# Patient Record
Sex: MALE | Race: WHITE | NOT HISPANIC OR LATINO | Employment: FULL TIME | URBAN - METROPOLITAN AREA
[De-identification: names, ages, dates, MRNs, and addresses within clinical notes are randomized per-mention and may not be internally consistent; named-entity substitution may affect disease eponyms.]

---

## 2017-01-22 ENCOUNTER — APPOINTMENT (OUTPATIENT)
Dept: GENERAL RADIOLOGY | Facility: HOSPITAL | Age: 32
End: 2017-01-22

## 2017-01-22 ENCOUNTER — HOSPITAL ENCOUNTER (EMERGENCY)
Facility: HOSPITAL | Age: 32
Discharge: HOME | End: 2017-01-22
Attending: EMERGENCY MEDICINE | Admitting: EMERGENCY MEDICINE

## 2017-01-22 VITALS
HEART RATE: 94 BPM | TEMPERATURE: 97.7 F | HEIGHT: 71 IN | SYSTOLIC BLOOD PRESSURE: 132 MMHG | WEIGHT: 220 LBS | DIASTOLIC BLOOD PRESSURE: 87 MMHG | BODY MASS INDEX: 30.8 KG/M2 | RESPIRATION RATE: 16 BRPM | OXYGEN SATURATION: 98 %

## 2017-01-22 DIAGNOSIS — S50.12XA CONTUSION OF LEFT FOREARM, INITIAL ENCOUNTER: Primary | ICD-10-CM

## 2017-01-22 PROCEDURE — 73090 X-RAY EXAM OF FOREARM: CPT

## 2017-01-22 PROCEDURE — 99283 EMERGENCY DEPT VISIT LOW MDM: CPT

## 2017-01-22 NOTE — ED NOTES
Left arm injury. States he hit it on metal during work. Says it is aching consistently and radiating to shoulder. Says it hurts worse when he makes a fist.      Vanesa Acuña, Nursing Student  01/22/17 0015

## 2017-01-23 NOTE — ED PROVIDER NOTES
EMERGENCY DEPARTMENT ENCOUNTER    CHIEF COMPLAINT  Chief Complaint: Arm Injury  History given by: patient  History limited by: None  Room Number: 25/25  PMD: No Known Provider      HPI:  Pt is a 31 y.o. male who presents complaining of left arm injury while at work today. The pt states he got up to walk around when he hit his forearm on a piece of metal. He denies numbness and tingling. No laceration. The pt denies fever, CP, and SOA. He denies other injury at this time.    Duration:  PTA  Onset: sudden  Timing: constant  Location: left forearm  Radiation: denies  Quality: brusing  Intensity/Severity: mild  Progression: unchanged  Associated Symptoms: denies  Aggravating Factors: ROM  Alleviating Factors: none  Previous Episodes: denies  Treatment before arrival: none    PAST MEDICAL HISTORY  Active Ambulatory Problems     Diagnosis Date Noted   • No Active Ambulatory Problems     Resolved Ambulatory Problems     Diagnosis Date Noted   • No Resolved Ambulatory Problems     Past Medical History   Diagnosis Date   • Hypertension        PAST SURGICAL HISTORY  Past Surgical History   Procedure Laterality Date   • Appendectomy     • Skin surgery       superficial knee injury       FAMILY HISTORY  History reviewed. No pertinent family history.    SOCIAL HISTORY  Social History     Social History   • Marital status: Single     Spouse name: N/A   • Number of children: N/A   • Years of education: N/A     Occupational History   • Not on file.     Social History Main Topics   • Smoking status: Current Every Day Smoker     Packs/day: 1.00     Years: 20.00     Types: Cigarettes   • Smokeless tobacco: Never Used   • Alcohol use Yes      Comment: rarely    • Drug use: No   • Sexual activity: Not on file     Other Topics Concern   • Not on file     Social History Narrative   • No narrative on file       ALLERGIES  Review of patient's allergies indicates no known allergies.    REVIEW OF SYSTEMS  Review of Systems   Constitutional:  Negative.    HENT: Negative.    Respiratory: Negative.    Cardiovascular: Negative.    Gastrointestinal: Negative.    Genitourinary: Negative.    Musculoskeletal: Positive for arthralgias and joint swelling.        Left forearm   Skin:        Hematoma to left forearm    All other systems reviewed and are negative.      PHYSICAL EXAM  ED Triage Vitals   Temp Heart Rate Resp BP SpO2   01/22/17 1812 01/22/17 1812 01/22/17 1812 01/22/17 1820 01/22/17 1812   97.7 °F (36.5 °C) 97 16 152/78 98 %      Temp src Heart Rate Source Patient Position BP Location FiO2 (%)   -- 01/22/17 1812 01/22/17 2004 01/22/17 2004 --    Monitor Sitting Right arm        Physical Exam   Constitutional: He is oriented to person, place, and time and well-developed, well-nourished, and in no distress.   Eyes: EOM are normal.   Neck: Normal range of motion.   Cardiovascular: Normal rate and regular rhythm.    Pulmonary/Chest: Effort normal and breath sounds normal. No respiratory distress.   Musculoskeletal: Normal range of motion. He exhibits tenderness.   Left UE - small hematoma to proximal ulna   FROM of elbow and wrist  NV intact distally.    Neurological: He is alert and oriented to person, place, and time. He has normal sensation and normal strength.   Skin: Skin is warm and dry.   Intact.    Psychiatric: Affect normal.   Nursing note and vitals reviewed.      LAB RESULTS  Lab Results (last 24 hours)     ** No results found for the last 24 hours. **          I ordered the above labs and reviewed the results    RADIOLOGY  XR Forearm 2 View Left   Preliminary Result   No acute fracture or dislocation.                   I ordered the above noted radiological studies. Interpreted by radiologist. Reviewed by me in PACS.       PROCEDURES  Procedures      PROGRESS AND CONSULTS  ED Course     8:26 PM  D/w pt radiology results which were negative acute. Decision to d/c pt was discussed. Pt was instructed to f/u with PMD. RTED warnings given. Pt  understands and agrees with plan of care, all questions and concerns addressed.     8:36 PM  Discussed pt's case with Dr. Duggan who agrees with plan of care.       MEDICAL DECISION MAKING  Results were reviewed/discussed with the patient and they were also made aware of online access. Pt also made aware that some labs, such as cultures, will not be resulted during ER visit and follow up with PMD is necessary.     MDM  Number of Diagnoses or Management Options     Amount and/or Complexity of Data Reviewed  Tests in the radiology section of CPT®: reviewed (Left Forearm XR - No acute fx or dislocation.     Independently viewed by me. Interpreted by radiologist  )           DIAGNOSIS  Final diagnoses:   Contusion of left forearm, initial encounter       DISPOSITION  Discharged    Latest Documented Vital Signs:  As of 8:26 PM  BP- 132/87 HR- 94 Temp- 97.7 °F (36.5 °C) O2 sat- 98%    --  Documentation assistance provided by re Crowley for MIGNON Dunham.  Information recorded by the scribe was done at my direction and has been verified and validated by me.       Honorio Crowley  01/22/17 2027       Honorio Crowley  01/22/17 2040       GLORIA Altamirano  01/23/17 0025

## 2017-01-23 NOTE — ED PROVIDER NOTES
Pt presents with left arm injury after he hit his arm on a piece of metal at work. On exam, he has soft tissue tenderness over left forearm. Neurovascular intact. Imaging was negative so he will be discharged.     upervised care provided by the midlevel provider.   We have discussed this patient's history, physical exam, and treatment plan.  I have reviewed the note and personally saw and examined the patient and agree with the plan of care. See attending note.  Documentation assistance provided by re Beckwith.  Information recorded by the scribe was done at my direction and has been verified and validated by me.       Zulma Beckwith  01/22/17 1972       Filemon Duggan MD  01/23/17 2147